# Patient Record
Sex: MALE | Race: WHITE | Employment: OTHER | URBAN - METROPOLITAN AREA
[De-identification: names, ages, dates, MRNs, and addresses within clinical notes are randomized per-mention and may not be internally consistent; named-entity substitution may affect disease eponyms.]

---

## 2021-03-02 ENCOUNTER — HOSPITAL ENCOUNTER (EMERGENCY)
Age: 36
Discharge: HOME | End: 2021-03-02
Attending: EMERGENCY MEDICINE

## 2021-03-02 ENCOUNTER — APPOINTMENT (OUTPATIENT)
Dept: GENERAL RADIOLOGY | Age: 36
End: 2021-03-02
Attending: EMERGENCY MEDICINE

## 2021-03-02 VITALS
TEMPERATURE: 98 F | HEART RATE: 98 BPM | RESPIRATION RATE: 18 BRPM | SYSTOLIC BLOOD PRESSURE: 158 MMHG | BODY MASS INDEX: 29.03 KG/M2 | HEIGHT: 69 IN | WEIGHT: 196 LBS | DIASTOLIC BLOOD PRESSURE: 85 MMHG | OXYGEN SATURATION: 100 %

## 2021-03-02 DIAGNOSIS — S46.911A STRAIN OF RIGHT SHOULDER, INITIAL ENCOUNTER: Primary | ICD-10-CM

## 2021-03-02 PROCEDURE — 73030 X-RAY EXAM OF SHOULDER: CPT | Performed by: EMERGENCY MEDICINE

## 2021-03-02 PROCEDURE — 99283 EMERGENCY DEPT VISIT LOW MDM: CPT

## 2021-03-02 NOTE — ED NOTES
Pt left without his discharge papers, MD was in the room. Explained xray result. Pt called from his cell phone and states he was not happy with his care. attempted to call him back but no answer.

## 2021-03-02 NOTE — ED PROVIDER NOTES
Patient Seen in: [facility] Emergency Department In [place]      History   Patient presents with:  Arm or Hand Injury    Stated Complaint: right shoulder injury    HPI/Subjective:   HPI    This is a 30-year-old male who presents with right shoulder pain. The patient is moving all extremities there is no focal findings.        ED Course   Labs Reviewed - No data to display       X-rays of the right shoulder were ordered         MDM      Xr Shoulder, Complete (min 2 Views), Right (cpt=73030)    Result Date: 3

## 2021-03-02 NOTE — ED INITIAL ASSESSMENT (HPI)
While bench pressing 280 lbs this morning , he heard a pop to right shoulder. Had right shoulder surgery 7 months ago.

## 2021-03-02 NOTE — ED NOTES
Attempted to call patient back regarding discharge instructions. No answer phone just rings with no voice mail.